# Patient Record
Sex: MALE | Race: WHITE
[De-identification: names, ages, dates, MRNs, and addresses within clinical notes are randomized per-mention and may not be internally consistent; named-entity substitution may affect disease eponyms.]

---

## 2019-01-14 ENCOUNTER — HOSPITAL ENCOUNTER (EMERGENCY)
Dept: HOSPITAL 80 - FED | Age: 49
LOS: 1 days | Discharge: STILL ADMITTED OR ADMINISTRATIVE | End: 2019-01-15
Payer: COMMERCIAL

## 2019-01-14 DIAGNOSIS — R07.9: ICD-10-CM

## 2019-01-14 DIAGNOSIS — I31.9: Primary | ICD-10-CM

## 2019-01-14 DIAGNOSIS — E86.9: ICD-10-CM

## 2019-01-14 LAB
INR PPP: 0.92 (ref 0.83–1.16)
PLATELET # BLD: 262 10^3/UL (ref 150–400)
PROTHROMBIN TIME: 12.6 SEC (ref 12–15)

## 2019-01-14 PROCEDURE — G0480 DRUG TEST DEF 1-7 CLASSES: HCPCS

## 2019-01-14 NOTE — EDPHY
H & P


Stated Complaint: R side chest pain, after dinner


Time Seen by Provider: 01/14/19 21:56


HPI/ROS: 





HPI





CHIEF COMPLAINT:  Chest pain.





HISTORY OF PRESENT ILLNESS:  48-year-old male presents emergency room with 

chest pain.  Patient states this started approximately 2 hr ago.  Shortly after 

eating dinner.  He was out at a restaurant had steak.  He states approximately 

30 min after eating he developed right-sided chest pain sharp stabbing worse 

when he takes deep breath in.  No abdominal pain no nausea vomiting.  Continues 

to have pain worse when he takes a deep breath in.  It is now moved from the 

right side of his chest more into the substernal region.


Denies any shortness of breath but does state it hurts when he takes a deep 

breath in.


He denies any injury does state that he did overhead presses yesterday.  At 

times he does state it hurts worse when he goes to lay back.  No fever.  No 

cough, no productive sputum.  Complains of sharp stabbing pain worse when he 

takes deep breath in.





Past Medical History:  Denies medical history





Past Surgical History:  Neck surgery





Social History:  Occasional alcohol use, denies drugs or alcohol.





Family History:  Denies family history of cardiovascular disease.








ROS   


REVIEW OF SYSTEMS:


10 Systems were reviewed and negative with the exception of the elements 

mentioned in the history of present illness.








Exam   


Constitutional appears well nontoxic  triage nursing summary reviewed, vital 

signs reviewed, awake/alert.  Vital signs stable.


Eyes   normal conjunctivae and sclera, EOMI, PERRLA. 


HENT   normal inspection, atraumatic, moist mucus membranes, no epistaxis, neck 

supple/ no meningismus, no raccoon eyes. 


Respiratory   clear to auscultation bilaterally, normal breath sounds, no 

respiratory distress, no wheezing. 


Cardiovascular   rate normal, regular rhythm, no murmur, no edema, distal 

pulses normal. 


Gastrointestinal   soft, non-tender, no rebound, no guarding, normal bowel 

sounds, no distension, no pulsatile mass. 


Genitourinary   no CVA tenderness. 


Musculoskeletal  no midline vertebral tenderness, full range of motion, no calf 

swelling, no tenderness of extremities, no meningismus, good pulses, 

neurovascularly intact.


Skin   pink, warm, & dry, no rash, skin atraumatic. 


Neurologic   awake, alert and oriented x 3, AAOx3, moves all 4 extremities 

equally, motor intact, sensory intact, CN II-XII intact, normal cerebellar, 

normal vision, normal speech. 


Psychiatric   normal mood/affect. 


Heme/Lymph/Immune   no lymphadenopathy.





Differential Diagnosis:  Differential diagnosis includes but is not limited to:

  ACS, atypical chest pain, pneumothorax, pneumonia, pulmonary embolism, aortic 

dissection, congestive heart failure, tumor, musculoskeletal pain, esophageal 

pain, GERD, peptic ulcer disease, pancreatitis





Medical Decision Making:  Plan for this patient GI cocktail to see if this 

improves his discomfort, chest x-ray two view to rule out pneumothorax, D-dimer 

to rule out PE, troponin, basic blood work electrolytes and CBC.





Re-evaluation:








EKG interpretation by me on record in [employer] system.  Impression time of 

EKG 2205, sinus rhythm rate of 79 ST depression noted lead to 3 AVF and 

specifically V4 V5 V6.  No old EKG to compare this to.





2311: Patient received a GI cocktail continues to have chest pain it did not 

help.  He had a repeat EKG that shows worsening ST depression to 3 AVF, V4 V5 

V6.  No ST elevation.


Given this worsening EKG and ongoing chest pain I have ordered him full-dose 

aspirin and nitroglycerin.





Will send a 2nd troponin.





EKG interpretation by me on record in [employer] system.  Impression time of 

EKG 2307, sinus rhythm rate of 93, ST depression V4 V5 V6 to 3 AVF seen on 

previous EKG but worse.





2320:  Patient re-evaluated after nitroglycerin this did bring his chest pain 

down from 9 attend 8 of 10. Will give a 2nd dose additionally morphine.  








2344:  Patient re-evaluated continues to have chest pain.  7/10 currently.  

Somewhat improved with nitroglycerin.





Will repeat his EKG and troponin





Will consult Cardiology.





Will placed on nitroglycerin drip to ongoing chest pain.  Watch blood pressure 

closely.  Patient also getting 2 L of fluid.





2350:  I have consult Cardiology Dr. Galarza, discussed case in detail agrees 

with nitroglycerin drip and heparin.  He has reviewed the patient's EKG.





I repeat his EKG a 3rd time to make sure there is no progression time of repeat 

EKG 2347 sinus rhythm rate of 82 again there is ST depression to 3 AVF V4 V5 V6 

but no ST elevation.








2351:  Patient being started on nitroglycerin and heparin.








Blood pressure checked in both arms left 127/62, right 134/74








Given the patient's having ongoing chest pain with an abnormal EKG I am 

concerned about a non-STEMI I have consult Cardiology Dr. Galarza, who is 

reviewed the patient's EKGs.  Agrees with current plan of nitroglycerin drip 

and heparin.


I have consult the hospitalist service Dr. Vera who agrees to admit.


Admit to PCU heparin and nitroglycerin drip.





Troponins have been negative so far.  However patient has ongoing chest pain.  

Abnormal EKG.








HEART Score for Major Cardiac Events from [email or web address]  on 1/14/2019


** All calculations should be rechecked by clinician prior to use **





RESULT SUMMARY:


3 points


Low Score (0-3 points)





Risk of MACE of 0.9-1.7%.








INPUTS:


History > 1 = Moderately suspicious


EKG > 1 = Non-specific repolarization disturbance


Age > 1 = 45-64


Risk factors > 0 = No known risk factors


Initial troponin > 0 = normal limit











1225AM:  Patient updated agrees for hospital admission.  Chest pain much 

improved after nitroglycerin drip.


Hospitalist service consult to Dr. Vera. 


Source: Patient





- Personal History


Current Tetanus Diphtheria and Acellular Pertussis (TDAP): Yes





- Medical/Surgical History


Hx Asthma: No


Hx Chronic Respiratory Disease: No


Hx Diabetes: No


Hx Cardiac Disease: No


Hx Renal Disease: No


Hx Cirrhosis: No


Hx Alcoholism: No


Hx HIV/AIDS: No


Hx Splenectomy or Spleen Trauma: No


Other PMH: Denies





- Social History


Smoking Status: Never smoked


Constitutional: 


 Initial Vital Signs











Temperature (C)  37.0 C   01/14/19 21:53


 


Heart Rate  79   01/14/19 21:53


 


Respiratory Rate  19   01/14/19 21:53


 


Blood Pressure  129/83 H  01/14/19 21:53


 


O2 Sat (%)  97   01/14/19 21:53








 











O2 Delivery Mode               Room Air














Allergies/Adverse Reactions: 


 





erythromycin base Allergy (Verified 01/15/19 08:52)


 Rash








Home Medications: 














 Medication  Instructions  Recorded


 


Colchicine [Colcrys] 0.6 mg PO BID 30 Days #60 tablet 01/15/19


 


Ibuprofen 800 mg PO TID 30 Days #90 tablet 01/15/19


 


Ranitidine HCl 150 mg PO BID 30 Days #60 tablet 01/15/19














Medical Decision Making





- Data Points


Laboratory Results: 


 Laboratory Results





 01/14/19 22:10 





 01/14/19 22:10 








Medications Given: 


 





Aspirin (Aspirin)  325 mg PO DAILY SUSANNA


   Stop: 07/14/19 08:59


   Last Admin: 01/15/19 09:40 Dose:  325 mg


Heparin Sodium (Porcine) (Heparin Injection)  0 unit IVP PRN PRN


   PRN Reason: re-Boluses required by protcol


   Stop: 07/14/19 06:40


   Last Admin: 01/15/19 06:50 Dose:  2,150 units


Nitroglycerin/Dextrose (Nitroglycerin 200 Mcg/Ml (Premix))  250 mls @ 0 mls/hr 

IV CONT SUSANNA; Titrate


   PRN Reason: Protocol


   Stop: 07/13/19 23:44


   Last Admin: 01/15/19 00:12 Dose:  250 mls





Discontinued Medications





Al Hydroxide/Mg Hydroxide (Maalox Susp)  30 ml PO ONCE ONE


   Stop: 01/14/19 22:04


   Last Admin: 01/14/19 22:19 Dose:  30 ml


Aspirin (Aspirin)  325 mg PO EDNOW ONE


   Stop: 01/14/19 23:03


   Last Admin: 01/14/19 23:06 Dose:  325 mg


Hydromorphone HCl (Dilaudid)  1 mg IVP EDNOW ONE


   Stop: 01/14/19 23:45


   Last Admin: 01/14/19 23:58 Dose:  1 mg


Hydromorphone HCl (Dilaudid)  1 mg IVP EDNOW ONE


   Stop: 01/15/19 03:55


   Last Admin: 01/15/19 03:58 Dose:  1 mg


Hyoscyamine Sulfate (Levsin, Hyomax-Sl)  0.25 mg PO ONCE ONE


   Stop: 01/14/19 22:04


   Last Admin: 01/14/19 22:19 Dose:  0.25 mg


Sodium Chloride (Ns)  1,000 mls @ 0 mls/hr IV EDNOW ONE; Wide Open


   PRN Reason: Protocol


   Stop: 01/14/19 22:03


   Last Admin: 01/14/19 22:14 Dose:  1,000 mls


Sodium Chloride (Ns)  1,000 mls @ 0 mls/hr IV ONCE ONE


   PRN Reason: Wide Open


   Stop: 01/14/19 23:32


   Last Admin: 01/14/19 23:32 Dose:  1,000 mls


Heparin Sodium (Porcine) (Heparin 50 Units/Ml (Premix))  500 mls @ 0 mls/hr IV 

EDNOW ONE


   PRN Reason: As Directed


   Stop: 01/14/19 23:55


   Last Admin: 01/15/19 00:12 Dose:  500 mls


Lidocaine (Lidocaine 2% Viscous)  15 ml PO ONCE ONE


   Stop: 01/14/19 22:04


   Last Admin: 01/14/19 22:18 Dose:  15 ml


Morphine Sulfate (Morphine)  6 mg IVP EDNOW ONE


   Stop: 01/14/19 23:21


   Last Admin: 01/14/19 23:25 Dose:  Not Given


Morphine Sulfate (Morphine)  4 mg IVP EDNOW ONE


   Stop: 01/14/19 23:24


   Last Admin: 01/14/19 23:24 Dose:  4 mg


Nitroglycerin (Nitrostat)  0.4 mg SL EDNOW ONE


   Stop: 01/14/19 23:03


   Last Admin: 01/14/19 23:06 Dose:  0.4 mg





Point of Care Test Results: 


 Chemistry











  01/14/19 01/14/19 01/14/19





  23:53 23:17 22:19


 


POC Troponin I  0.00 ng/mL ng/mL  0.00 ng/mL ng/mL  0.00 ng/mL ng/mL





   (0.00-0.08)   (0.00-0.08)   (0.00-0.08) 














Departure





- Departure


Disposition: [facility] Inpatient Acute


Clinical Impression: 


Chest pain


Qualifiers:


 Chest pain type: unspecified Qualified Code(s): R07.9 - Chest pain, unspecified





Condition: Good

## 2019-01-15 VITALS — DIASTOLIC BLOOD PRESSURE: 67 MMHG | SYSTOLIC BLOOD PRESSURE: 135 MMHG

## 2019-01-15 LAB
INR PPP: 0.99 (ref 0.83–1.16)
PLATELET # BLD: 240 10^3/UL (ref 150–400)
PROTHROMBIN TIME: 13.3 SEC (ref 12–15)

## 2019-01-15 NOTE — ECHO
[email or web address]





[address] [address] 

Main: [phone] 



Fax: 



Transthoracic Echocardiogram 

Name:             CECELIA ASHFORD                 MR#:

[id number]

Study Date:       01/15/2019                         Study Time:

01:20 PM

YOB: 1970                         Age:           48

year(s)

Height:           177.8 cm (70 in.)                  Weight:

86.18 kg (190 lb.)

BSA:              2.04 m2                            Gender:

Male

Examination:      Echo                               Indication:

Chest Pain

Image Quality:    Excellent                          Contrast: 

Requested by:     Isidro Hardwick                         BP:

127 mmHg/73 mmHg

Heart Rate:                                          Rhythm: 

Indication:       Chest Pain 



Procedure Staff 

Ultrasound Technician:   Belgica Herrera [facility] 

Reading Physician:       Isidro Hardwikc MD 

Requesting Provider: 



Conclusions:          Normal size left ventricle.  

No LV hypertrophy.  

Global hypercontractility of the left ventricle.  

The ejection fraction is estimated to be 70-75 %.  

No regional wall motion abnormality.  

Normal diastolic LV function.  

Normal RV function.  

The left atrium is normal in size.  

Normal appearing atrial septum.  

The right atrium is normal in size.  

The mitral valve is normal in appearance and function.  

The aortic valve is normal in appearance and function.  

The tricuspid valve is normal in appearance and function.  

The pulmonary artery pressure is normal.  

Trivial anterior pericardial effusion.  

No echocardiographic evidence of hemodynamic compromise.  

The pericardial effusion is adjacent to the right ventricle. 



Measurements: 

Chambers                    Valvular Assessment AV/MV

Valvular Assessment TV/PV



Normal                                   Normal

Normal

Name         Value     Range              Name         Value Range

Name          Value Range

Ao Lucita (MM): 3.3 cm    (2.2 cm-3.7            AV Vmax:     1.57 m/s (1

m/s-1.7       TR Vmax:      2.70 mm/s ( - )



cm)                                  m/s)             TR PGmax:     29

mmHg ( - )

IVSd (2D):   0.5 cm (0.6 cm-1.1               AV maxPG:    10 mmHg ( -

)         syst. PAP: 34 mmHg  ( - )



cm)                AV meanP mmHg ( - )  

LVDd (2D):   5.5 cm    (4.2 cm-5.9            MV E Vmax:   0.61 m/s (

- )



cm)                MV A Vmax:   0.54 m/s ( - )  

LVDs (2D):   3.4 cm    (2.1 cm-4              MV E/A:      1.13 ( - )





cm)   



Patient: CECELIA ASHFORD                 MRN: [id number]

Study Date: 01/15/2019   Page 1 of 2

01:20 PM 









LVPWd (2D): 0.7 cm (0.6 cm-1 

cm)   



LVEF (BP):   73 %      (>=55 %)   

EF Range:    70-75 % 



Continued Measurements: 

Chambers                      Valvular Assessment AV/MV

Valvular Assessment TV/PV



Name                       Value          Name

Value    Name                      Value

LADs:                   3.7 cm                MV E/E' Septal:

5.60      CVP (est.):             5 mmHg

LADs Lon.0 cm                MV E/E' Lateral:

5.10

LA Area:                22.3 cm2   

LA Volume:              63 ml   

LA Volume Index:        30.9 ml/m2   



Additional Vessels  



Name                       Value  

Ao Ascending:           3.2 cm    



Findings:             Left Ventricle: 

Normal size left ventricle. No LV hypertrophy. Global

hypercontractility of the left ventricle. The

ejection fraction is estimated to be 70-75 %. No regional wall motion

abnormality. Normal diastolic

LV function.  

Right Ventricle: 

Normal size right ventricle. Normal RV function.  

Left Atrium: 

The left atrium is normal in size. Normal appearing atrial septum.  

Right Atrium: 

The right atrium is normal in size.  

Mitral Valve: 

The mitral valve is normal in appearance and function. Trivial mitral

valve regurgitation.

Aortic Valve: 

The aortic valve is normal in appearance and function. The aortic

valve is tri-leaflet.

Tricuspid Valve: 

The tricuspid valve is normal in appearance and function. Mild

tricuspid regurgitation is present. The

pulmonary artery pressure is normal.  

Pulmonic Valve: 

The pulmonic valve is normal in appearance and function.  

Aorta: 

The aorta is normal.  

IVC: 

The IVC is normal sized.  

Pericardium: 

Trivial anterior pericardial effusion. No echocardiographic evidence

of hemodynamic compromise.

The pericardial effusion is adjacent to the right ventricle. 







Electronically signed by Isidro Hardwick MD on 01/15/2019 at 02:42 PM 

(No Signature Object) 



Patient: CECELIA ASHFORD                 MRN: [id number]

Study Date: 01/15/2019   Page 2 of 2

01:20 PM 







[id number]

## 2019-01-15 NOTE — CPEKG
Test Reason : OPEN

Blood Pressure : ***/*** mmHG

Vent. Rate : 082 BPM     Atrial Rate : 083 BPM

   P-R Int : 151 ms          QRS Dur : 091 ms

    QT Int : 498 ms       P-R-T Axes : 096 061 065 degrees

   QTc Int : 582 ms

 

Sinus rhythm

Minimal ST depression, lateral leads

Prolonged QT interval

 

Confirmed by Brennan Herrera (21) on 1/15/2019 7:47:13 AM

 

Referred By:             Confirmed By:Brennan Herrera

## 2019-01-15 NOTE — PDDCSUM
Discharge Summary


Discharge Summary: 





patient is a 48 year old male with chest pain. ECG showed some diffuse ST 

depression. he was started on nitro and heparin. Cardiology was consulted. Echo 

was obtained. Cardiology felt that his presentation and Echo and ECG all were 

consistent with pericarditis. HE was started on ibuprofen and colchicine and 

discharged to follow up with cardiology and his PCP





Discharge diagnosis- 


Pericarditis


Chest pain





New medications


Advil 800 TID with taper


Colchicine 0.6 bid





I spent over 35 minutes on discussing discharge, planning, and arranging follow 

up [facility] patient.

## 2019-01-15 NOTE — CPEKG
Test Reason : OPEN

Blood Pressure : ***/*** mmHG

Vent. Rate : 093 BPM     Atrial Rate : 093 BPM

   P-R Int : 176 ms          QRS Dur : 087 ms

    QT Int : 385 ms       P-R-T Axes : 097 062 069 degrees

   QTc Int : 479 ms

 

Sinus rhythm

Borderline ST depression, anterolateral leads

Borderline prolonged QT interval

 

Confirmed by Brennan Herrera (21) on 1/15/2019 7:47:12 AM

 

Referred By:             Confirmed By:Brennan Herrera

## 2019-01-15 NOTE — CPEKG
Test Reason : OPEN

Blood Pressure : ***/*** mmHG

Vent. Rate : 079 BPM     Atrial Rate : 079 BPM

   P-R Int : 134 ms          QRS Dur : 091 ms

    QT Int : 387 ms       P-R-T Axes : 090 069 050 degrees

   QTc Int : 444 ms

 

Sinus rhythm

Minimal ST depression, diffuse leads

 

Confirmed by Brennan Herrera (21) on 1/15/2019 7:47:12 AM

 

Referred By:             Confirmed By:Brennan Herrera

## 2019-01-15 NOTE — GCON
CARDIOLOGY CONSULTATION



DATE OF CONSULTATION:  01/15/2019



REASON FOR CONSULTATION:  Chest pain.



HISTORY OF PRESENT ILLNESS:  The patient is a pleasant 48-year-old gentleman who is healthy-appearing
, with past medical history of spasmodic dysphonia, status post surgical correction in the setting of
 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 
he developed acute onset around 7:30 p.m. while having dinner of right-sided, intense, sharp, 7/10 ch
est pain.  He states that he stayed at home for approximately an hour and a half after the onset of p
ain.  He reports his pain intensified up to a 10/10, prompting [facility].  He also states at that time that the pain radiated from the right chest to the center of 
his chest, prompting him to present for further evaluation. 



His initial ECG at 2155 demonstrated sinus rhythm at 79 beats per minute, with minimal diffuse ST-seg
ment depression.  Followup ECGs demonstrated normal sinus rhythm, with no evidence of ST-segment child
ges.  His initial troponin on presentation was unremarkable. 



The patient describes his pain as respirophasic, worse with deep inspiration.  He also describes his 
pain as markedly more severe with lying down and improved with sitting up and leaning forward. 



He describes a recent history of a GI viral illness syndrome last week, associated with fevers and ch
ills.  He had an upper respiratory infection approximately 6 weeks earlier. 



Currently, at the time of my exam, he appears to be resting comfortably.  However, he describes subst
ernal chest pain of 5/10 ongoing. 



He has undergone serial troponins x3 since his admission, all of which have been negative. 



Chest x-ray is unremarkable.  Most recent ECG demonstrates sinus rhythm at 92 beats per minute, with 
normal intervals, normal axis, and no ST-segment changes.



PAST MEDICAL HISTORY:  Only notable for spasmodic dysphonia, status post surgical correction.



MEDICATIONS:  He is on no medications.



ALLERGIES:  He has no allergies to medications.



FAMILY HISTORY:  He has no family history of premature coronary artery disease.  His parents are aliv
e and well.  He has 2 siblings who are alive and well.



SOCIAL HISTORY:  He is .  He has no children.  He works for [employer].  He is a nonsmoker.  H
e drinks 3-4 alcoholic beverages per week.  He exercises regularly with spinning classes and weightli
fting.



PHYSICAL EXAMINATION:  GENERAL:  He is awake, alert, oriented, appropriate, and resting comfortably d
espite his complaint of 5/10 chest pain.  VITAL SIGNS:  His vital signs demonstrate blood pressure of
 127/73, heart rate of 95, in sinus rhythm, oxygen saturation 95% on room air, temperature 36.5.  The
re is no evidence of JVP or carotid bruits. CARDIAC:  S1, S2.  Regular rate and rhythm.  No murmurs, 
rubs, or gallops.  ABDOMEN:  Soft, nontender, nondistended.  There is no pulsatile mass or abdominal 
bruit.  LUNGS:  Clear to auscultation bilaterally.  There is no evidence of cyanosis, clubbing or stephanie
ma.



LABORATORY DATA:  White blood cell count 13.01, with elevated neutrophil count of 85.3, hemoglobin of
 11.5, and hematocrit of 33.9, platelets of 240.  Of note, initial white blood cell count on admissio
n of 12.23.  Initial hemoglobin of 13.5 has trended down to 11.5. 



D-dimer negative at 0.36.  Troponins negative x3.  Urine drug screen negative. 



Lipid profile demonstrates total cholesterol of 128, triglycerides of 54, HDL 54, and LDL of 63.  He 
is on no statin medication. 



Since his arrival to [facility], he had been given nitroglycerin.  He states the nitr
oglycerin has not changed his pain.  He has been given Maalox with no pain.  He states that morphine 
is the only thing that has provided him relief at this time.



IMPRESSION:  

1.  Pleuritic respirophasic chest pain, which is positional, worse with lying down, improved with sit
ting up.  

2.  History of recent viral gastrointestinal illness with fevers and chills.

3.  Pericarditis. 



Impression:  The patient is a pleasant 48-year-old gentleman with a history of febrile gastrointestin
al illness 1 week ago with acute onset of chest pain last evening that has persisted throughout the c
ourse of his hospitalization.  Troponins are negative x3.  No ischemic ECG changes.  His symptoms and
 history are suggestive of pericarditis.



PLAN:  

1.  Echocardiogram to be performed in the ER. 

2.  Further workup pending the results of his echocardiogram.





Job #:  [id number]

## 2019-01-15 NOTE — GHP
DATE OF ADMISSION:  01/14/2019



CHIEF COMPLAINT:  Chest pain.



HISTORY OF PRESENT ILLNESS:  A 48-year-old male with no significant past 
medical history visitig here from [place] for work presents with chest pain.  Pain 
started 2 hours ago an hour after eating a steak dinner. Pain started under 
right pectoral muscle and radiated to middle. 10/10 stabbing sensation worse 
with deep with clamminess and mild SOB. No radiation to the arm, neck, jaw, or 
back.  No F/C/S or cough. Had the stomach flu last week for a day. No pain 
relief with GI cocktail, but down to 5 with nitro.



Lifts weights 3 times a week for an hour with no chest pain or shortness of 
breath.  





REVIEW OF SYSTEMS:  I completed a 10-point review of systems, negative except 
as noted in HPI.



PAST MEDICAL HISTORY:  Spasmodic dysphonia.



PAST SURGICAL HISTORY:  Neck surgery.



FAMILY HISTORY:  Minor stroke mother, father with diabetes.



SOCIAL HISTORY:  Lives in [place].  Works for an [employer] company.  Drinks 3-4 
alcoholic drinks a week.  No tobacco or illicits.



ALLERGIES:  None.



HOME MEDICATIONS:  None.



PHYSICAL EXAM:  Temperature 36.5, blood pressure 129/88, heart rate in the 90s, 
respirations 18, 94% on room air.  GENERAL:  Well appearing, no acute distress.
  Mildly pale.  HEENT:  PERRLA.  Moist mucous membranes.  CV:  Regular rate and 
rhythm.  No murmurs, gallops, or rubs.  No lower extremity edema.  LUNGS:  
Clear.  No crackles or wheezing.  ABDOMEN:  Soft, nontender, nondistended.  
Positive bowel sounds.  :  No Rivera.  MUSCULOSKELETAL:  5/5 upper and lower 
extremity strength.  No reproducible chest pain.  NEUROLOGIC:  2 through 12 
intact.  PSYCHIATRIC:  Alert and oriented x3.



LABS:  WBC 12, hemoglobin 13, hematocrit 40, platelets 262.  D-dimer negative.  
Coags negative.  Sodium 138, potassium 4.1, chloride 100, carbon dioxide 28, 
creatinine 1.1.  Unknown baseline hematocrit.  Glucose 96.  LFTs within normal.
  Lipase normal.  Troponin 0.03 x3. 



Chest x-ray personally reviewed by me.  No effusion or opacity. 



EKG personally reviewed by me.  ST depression in lead 1, aVL, V4 through V6.



ASSESSMENT AND PLAN:  

1.  Chest pain:  EKG concerning with ST depressions.  Dr. Herrera spoke with 
Dr. Galarza who reviewed his EKGs who agreed with nitroglycerin drip and 
heparin.  Cardiology to consult in the morning.  Check lipids, A1c and drug 
screen. Control pain with IV morphine. May warrant cardiac catheterization in 
the morning if pain is persistent.

2.  Diet:  N.p.o. 

3.  Deep vein thrombosis prophylaxis:  On heparin drip.

4.  Disposition:  Warrants observation admission given ongoing chest pain, 
concern for acute coronary syndrome, warranting IV nitroglycerin and heparin.





Job #:  [id number]

MTDD